# Patient Record
Sex: MALE | Race: WHITE | NOT HISPANIC OR LATINO | Employment: FULL TIME | URBAN - METROPOLITAN AREA
[De-identification: names, ages, dates, MRNs, and addresses within clinical notes are randomized per-mention and may not be internally consistent; named-entity substitution may affect disease eponyms.]

---

## 2017-03-14 ENCOUNTER — HOSPITAL ENCOUNTER (EMERGENCY)
Facility: HOSPITAL | Age: 52
Discharge: HOME | End: 2017-03-14
Attending: EMERGENCY MEDICINE
Payer: OTHER GOVERNMENT

## 2017-03-14 VITALS
TEMPERATURE: 99 F | HEART RATE: 73 BPM | SYSTOLIC BLOOD PRESSURE: 133 MMHG | RESPIRATION RATE: 20 BRPM | WEIGHT: 210 LBS | OXYGEN SATURATION: 97 % | DIASTOLIC BLOOD PRESSURE: 81 MMHG

## 2017-03-14 DIAGNOSIS — S82.432A CLOSED DISPLACED OBLIQUE FRACTURE OF SHAFT OF LEFT FIBULA, INITIAL ENCOUNTER: Primary | ICD-10-CM

## 2017-03-14 DIAGNOSIS — T14.90XA TRAUMA: ICD-10-CM
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
## 2017-03-14 DIAGNOSIS — S93.422A TEAR OF DELTOID LIGAMENT OF LEFT ANKLE, INITIAL ENCOUNTER: ICD-10-CM

## 2017-03-14 DIAGNOSIS — M25.572 LEFT ANKLE PAIN: ICD-10-CM

## 2017-03-14 PROCEDURE — 29515 APPLICATION SHORT LEG SPLINT: CPT | Mod: LT

## 2017-03-14 PROCEDURE — 99283 EMERGENCY DEPT VISIT LOW MDM: CPT | Mod: 25

## 2017-03-14 PROCEDURE — 25000003 PHARM REV CODE 250: Performed by: EMERGENCY MEDICINE

## 2017-03-14 RX ORDER — HYDROCODONE BITARTRATE AND ACETAMINOPHEN 10; 325 MG/1; MG/1
1 TABLET ORAL
Status: COMPLETED | OUTPATIENT
Start: 2017-03-14 | End: 2017-03-14

## 2017-03-14 RX ORDER — HYDROCODONE BITARTRATE AND ACETAMINOPHEN 10; 325 MG/1; MG/1
1 TABLET ORAL EVERY 4 HOURS PRN
Qty: 18 TABLET | Refills: 0 | Status: SHIPPED | OUTPATIENT
Start: 2017-03-14

## 2017-03-14 RX ORDER — PAROXETINE 10 MG/1
10 TABLET, FILM COATED ORAL EVERY MORNING
COMMUNITY

## 2017-03-14 RX ADMIN — HYDROCODONE BITARTRATE AND ACETAMINOPHEN 1 TABLET: 10; 325 TABLET ORAL at 05:03

## 2017-03-14 NOTE — ED PROVIDER NOTES
"     History      Chief Complaint   Patient presents with    Ankle Pain     left ankle pain with some deform noted. after falling approx. 20 ft feet off ladder just prior to arrival to er.        Review of patient's allergies indicates:  No Known Allergies     HPI   HPI    3/14/2017, 5:52 PM   History obtained from the patient      History of Present Illness: Ralf Armijo is a 52 y.o. male patient who presents to the Emergency Department for complaints of left ankle pain.  The patient states that short while ago he was on a ladder and was approximately 15-20 feet up and slipped off of the ladder landing onto his left foot.  The patient noted that he everted his left foot and heard a loud "pop" and fell to the ground.  The patient noted that he did not injure any other part of his body and did not hit his head or his neck and denied any loss of consciousness.  There is no history of fever chills nausea vomiting and there are no other major concerns or complaints noted at this time.      Arrival mode: Personal vehicle    PCP: No primary care provider on file.       Past Medical History:  Past Medical History:   Diagnosis Date    Depression     PTSD (post-traumatic stress disorder)        Past Surgical History:  Past Surgical History:   Procedure Laterality Date    KNEE SURGERY      right          Family History:  History reviewed. No pertinent family history.    Social History:  Social History     Social History Main Topics    Smoking status: Never Smoker    Smokeless tobacco: Not on file    Alcohol use Not on file    Drug use: Not on file    Sexual activity: Not on file       ROS   Review of Systems   Constitutional: Negative for fever.   HENT: Negative for sore throat.    Respiratory: Negative for shortness of breath.    Cardiovascular: Negative for chest pain.   Gastrointestinal: Negative for nausea.   Genitourinary: Negative for dysuria.   Musculoskeletal: Negative for back pain.        Left ankle " pain   Skin: Negative for rash.   Neurological: Negative for weakness.   Hematological: Does not bruise/bleed easily.   All other systems reviewed and are negative.      Physical Exam    Initial Vitals   BP Pulse Resp Temp SpO2   03/14/17 1728 03/14/17 1728 03/14/17 1728 03/14/17 1728 03/14/17 1728   137/78 83 20 98.9 °F (37.2 °C) 100 %      Physical Exam  Nursing Notes and Vital Signs Reviewed.  Constitutional: Patient is in mild distress. Awake and alert. Well-developed and well-nourished.  Head: Atraumatic. Normocephalic.  Eyes: PERRL. EOM intact. Conjunctivae are not pale. No scleral icterus.  ENT: Mucous membranes are moist. Oropharynx is clear and symmetric.    Neck: Supple. Full ROM. No lymphadenopathy.  Cardiovascular: Regular rate. Regular rhythm. No murmurs, rubs, or gallops. Distal pulses are 2+ and symmetric.  Pulmonary/Chest: No respiratory distress. Clear to auscultation bilaterally. No wheezing, rales, or rhonchi.  Abdominal: Soft and non-distended.  There is no tenderness.  No rebound, guarding, or rigidity. Good bowel sounds.  Genitourinary: No CVA tenderness  Musculoskeletal: Moves all extremities. No obvious deformities. No edema. No calf tenderness.  Noted left medial malleolus swelling with good dorsalis pedal pulse noted to the left side and neurovascularly intact distal to the injury.  Skin: Warm and dry.  Neurological:  Alert, awake, and appropriate.  Normal speech.  No acute focal neurological deficits are appreciated.  Psychiatric: Normal affect. Good eye contact. Appropriate in content.    ED Course    Procedures  ED Vital Signs:  Vitals:    03/14/17 1728   BP: 137/78   Pulse: 83   Resp: 20   Temp: 98.9 °F (37.2 °C)   TempSrc: Oral   SpO2: 100%   Weight: 95.3 kg (210 lb)       Abnormal Lab Results:  Labs Reviewed - No data to display     All Lab Results:  No labs drawn at this time.    Imaging Results:  Imaging Results     None                   The Emergency Provider reviewed the vital  signs and test results, which are outlined above.    ED Discussion         ED Medication(s):  Medications   hydrocodone-acetaminophen 10-325mg per tablet 1 tablet (1 tablet Oral Given 3/14/17 2006)       New Prescriptions    No medications on file             Medical Decision Making    Medical Decision Making:   Initial Assessment:   All historical, clinical, and radiographic findings were reviewed with the patient in detail.  Patient has been advised of the diagnosis of LEFT tibial fracture and deltoid ligament injury.  Patient wishes to FU with his orthopedist in [place].  Discussed with Dr Hernandez and he feels that is ok.  Patient cautioned to use aspirin 325mg daily and keep the extremity elevated in transit to [place] in light of DVT risk.  Also counseled regarding use of isometrics to improve venous blood flow.  All remaining questions and concerns were addressed at that time.  Splint in place.  Remains NVI post splint.  Patient has been counseled regarding the need for follow-up as well as the indications to return to the emergency room should new or worrisome developments (including but not limited to worsening pain, cyanosis, or loss of strength or sensation) occur.    Rogerio Torres MD  8:40 PM             Scribe Attestation:   Scribe #1: I performed the above scribed service and the documentation accurately describes the services I performed. I attest to the accuracy of the note.    Attending:   Physician Attestation Statement for Scribe #1: I, Baldo Ma MD, personally performed the services described in this documentation, as scribed by Baldo Ma, in my presence, and it is both accurate and complete.          Clinical Impression       ICD-10-CM ICD-9-CM   1. Closed displaced oblique fracture of shaft of left fibula, initial encounter S82.432A 823.21   2. Trauma T14.90 959.9   3. Fall W19.XXXA E888.9   4. Left ankle pain M25.572 719.47   5. Tear of deltoid ligament of left ankle, initial  encounter S93.422A 845.01               Rogerio Torres MD  03/14/17 2047

## 2017-03-14 NOTE — ED NOTES
Pt with left ankle swelling and deform.noted -strong pedal pulse palp. Ice pack applied  And elevated.

## 2017-03-15 NOTE — ED NOTES
Crutches provided to patient per Dr. Torres's instruction. Pt demonstrated how to use the crutches safely.
